# Patient Record
Sex: MALE | Race: WHITE | NOT HISPANIC OR LATINO | Employment: UNEMPLOYED | URBAN - METROPOLITAN AREA
[De-identification: names, ages, dates, MRNs, and addresses within clinical notes are randomized per-mention and may not be internally consistent; named-entity substitution may affect disease eponyms.]

---

## 2018-05-23 ENCOUNTER — HISTORICAL (OUTPATIENT)
Dept: PREADMISSION TESTING | Facility: HOSPITAL | Age: 53
End: 2018-05-23

## 2020-11-11 ENCOUNTER — HISTORICAL (OUTPATIENT)
Dept: ADMINISTRATIVE | Facility: HOSPITAL | Age: 55
End: 2020-11-11

## 2020-11-11 LAB
ANTINUCLEAR ANTIBODY SCREEN (OHS): NEGATIVE
DSDNA ANTIBODY (OHS): NEGATIVE
T PALLIDUM AB SER QL: NONREACTIVE
TSH SERPL-ACNC: 0.61 UIU/ML (ref 0.35–4.94)
VIT B12 SERPL-MCNC: 576 PG/ML (ref 213–816)

## 2022-04-10 ENCOUNTER — HISTORICAL (OUTPATIENT)
Dept: ADMINISTRATIVE | Facility: HOSPITAL | Age: 57
End: 2022-04-10

## 2022-04-25 VITALS
DIASTOLIC BLOOD PRESSURE: 62 MMHG | OXYGEN SATURATION: 98 % | WEIGHT: 186.75 LBS | HEIGHT: 73 IN | BODY MASS INDEX: 24.75 KG/M2 | SYSTOLIC BLOOD PRESSURE: 102 MMHG

## 2022-04-27 ENCOUNTER — HISTORICAL (OUTPATIENT)
Dept: ADMINISTRATIVE | Facility: HOSPITAL | Age: 57
End: 2022-04-27

## 2022-05-06 ENCOUNTER — HOSPITAL ENCOUNTER (EMERGENCY)
Facility: HOSPITAL | Age: 57
Discharge: PSYCHIATRIC OR SUBSTANCE USE FACILITY | End: 2022-05-06
Attending: EMERGENCY MEDICINE

## 2022-05-06 VITALS
HEIGHT: 72 IN | RESPIRATION RATE: 16 BRPM | OXYGEN SATURATION: 99 % | TEMPERATURE: 97 F | SYSTOLIC BLOOD PRESSURE: 128 MMHG | DIASTOLIC BLOOD PRESSURE: 64 MMHG | BODY MASS INDEX: 23.03 KG/M2 | WEIGHT: 170 LBS | HEART RATE: 92 BPM

## 2022-05-06 DIAGNOSIS — R60.0 LEG EDEMA, LEFT: ICD-10-CM

## 2022-05-06 DIAGNOSIS — F02.818 DEMENTIA ASSOCIATED WITH OTHER UNDERLYING DISEASE WITH BEHAVIORAL DISTURBANCE: Primary | ICD-10-CM

## 2022-05-06 LAB
ALBUMIN SERPL-MCNC: 4.4 GM/DL (ref 3.5–5)
ALBUMIN/GLOB SERPL: 1.7 RATIO (ref 1.1–2)
ALP SERPL-CCNC: 78 UNIT/L (ref 40–150)
ALT SERPL-CCNC: 34 UNIT/L (ref 0–55)
AMPHET UR QL SCN: NEGATIVE
APAP SERPL-MCNC: <17.4 UG/ML (ref 17.4–30)
APPEARANCE UR: CLEAR
AST SERPL-CCNC: 33 UNIT/L (ref 5–34)
BACTERIA #/AREA URNS AUTO: NORMAL /HPF
BARBITURATE SCN PRESENT UR: NEGATIVE
BASOPHILS # BLD AUTO: 0.03 X10(3)/MCL (ref 0–0.2)
BASOPHILS NFR BLD AUTO: 0.4 %
BENZODIAZ UR QL SCN: NEGATIVE
BILIRUB UR QL STRIP.AUTO: NEGATIVE MG/DL
BILIRUBIN DIRECT+TOT PNL SERPL-MCNC: 0.3 MG/DL (ref 0–0.5)
BILIRUBIN DIRECT+TOT PNL SERPL-MCNC: 0.6 MG/DL (ref 0–0.8)
BILIRUBIN DIRECT+TOT PNL SERPL-MCNC: 0.9 MG/DL
BUN SERPL-MCNC: 15 MG/DL (ref 8.4–25.7)
CALCIUM SERPL-MCNC: 9.6 MG/DL (ref 8.4–10.2)
CANNABINOIDS UR QL SCN: NEGATIVE
CHLORIDE SERPL-SCNC: 105 MMOL/L (ref 98–107)
CO2 SERPL-SCNC: 28 MMOL/L (ref 22–29)
COCAINE UR QL SCN: NEGATIVE
COLOR UR AUTO: ABNORMAL
CREAT SERPL-MCNC: 0.81 MG/DL (ref 0.73–1.18)
EOSINOPHIL # BLD AUTO: 0.09 X10(3)/MCL (ref 0–0.9)
EOSINOPHIL NFR BLD AUTO: 1.2 %
ERYTHROCYTE [DISTWIDTH] IN BLOOD BY AUTOMATED COUNT: 12.6 % (ref 11.5–17)
ETHANOL SERPL-MCNC: <10 MG/DL
FENTANYL UR QL SCN: NEGATIVE
GLOBULIN SER-MCNC: 2.6 GM/DL (ref 2.4–3.5)
GLUCOSE SERPL-MCNC: 91 MG/DL (ref 74–100)
GLUCOSE UR QL STRIP.AUTO: NEGATIVE MG/DL
HCT VFR BLD AUTO: 43.1 % (ref 42–52)
HGB BLD-MCNC: 13.9 GM/DL (ref 14–18)
IMM GRANULOCYTES # BLD AUTO: 0.01 X10(3)/MCL (ref 0–0.02)
IMM GRANULOCYTES NFR BLD AUTO: 0.1 % (ref 0–0.43)
KETONES UR QL STRIP.AUTO: ABNORMAL MG/DL
LEUKOCYTE ESTERASE UR QL STRIP.AUTO: NEGATIVE UNIT/L
LYMPHOCYTES # BLD AUTO: 1.48 X10(3)/MCL (ref 0.6–4.6)
LYMPHOCYTES NFR BLD AUTO: 20.1 %
MCH RBC QN AUTO: 29.8 PG (ref 27–31)
MCHC RBC AUTO-ENTMCNC: 32.3 MG/DL (ref 33–36)
MCV RBC AUTO: 92.3 FL (ref 80–94)
MDMA UR QL SCN: NEGATIVE
MONOCYTES # BLD AUTO: 0.58 X10(3)/MCL (ref 0.1–1.3)
MONOCYTES NFR BLD AUTO: 7.9 %
NEUTROPHILS # BLD AUTO: 5.2 X10(3)/MCL (ref 2.1–9.2)
NEUTROPHILS NFR BLD AUTO: 70.3 %
NITRITE UR QL STRIP.AUTO: NEGATIVE
NRBC BLD AUTO-RTO: 0 %
OPIATES UR QL SCN: NEGATIVE
PCP UR QL: NEGATIVE
PH UR STRIP.AUTO: 5 [PH]
PH UR: 5 [PH] (ref 3–11)
PLATELET # BLD AUTO: 251 X10(3)/MCL (ref 130–400)
PMV BLD AUTO: 10.5 FL (ref 9.4–12.4)
POTASSIUM SERPL-SCNC: 4.4 MMOL/L (ref 3.5–5.1)
PROT SERPL-MCNC: 7 GM/DL (ref 6.4–8.3)
PROT UR QL STRIP.AUTO: NEGATIVE MG/DL
RBC # BLD AUTO: 4.67 X10(6)/MCL (ref 4.7–6.1)
RBC #/AREA URNS AUTO: NORMAL /HPF
RBC UR QL AUTO: NEGATIVE UNIT/L
SARS-COV-2 RDRP RESP QL NAA+PROBE: NEGATIVE
SODIUM SERPL-SCNC: 141 MMOL/L (ref 136–145)
SP GR UR STRIP.AUTO: 1.03 (ref 1–1.03)
SPECIFIC GRAVITY, URINE AUTO (.000) (OHS): 1.03 (ref 1–1.03)
SQUAMOUS #/AREA URNS AUTO: NORMAL /LPF
TSH SERPL-ACNC: 0.91 UIU/ML (ref 0.35–4.94)
UROBILINOGEN UR STRIP-ACNC: 1 MG/DL
WBC # SPEC AUTO: 7.4 X10(3)/MCL (ref 4.5–11.5)
WBC #/AREA URNS AUTO: NORMAL /HPF

## 2022-05-06 PROCEDURE — 82077 ASSAY SPEC XCP UR&BREATH IA: CPT | Performed by: PHYSICIAN ASSISTANT

## 2022-05-06 PROCEDURE — 36415 COLL VENOUS BLD VENIPUNCTURE: CPT | Performed by: PHYSICIAN ASSISTANT

## 2022-05-06 PROCEDURE — 81003 URINALYSIS AUTO W/O SCOPE: CPT | Mod: 59 | Performed by: PHYSICIAN ASSISTANT

## 2022-05-06 PROCEDURE — 85025 COMPLETE CBC W/AUTO DIFF WBC: CPT | Performed by: PHYSICIAN ASSISTANT

## 2022-05-06 PROCEDURE — 80143 DRUG ASSAY ACETAMINOPHEN: CPT | Performed by: PHYSICIAN ASSISTANT

## 2022-05-06 PROCEDURE — 81015 MICROSCOPIC EXAM OF URINE: CPT | Performed by: PHYSICIAN ASSISTANT

## 2022-05-06 PROCEDURE — 80307 DRUG TEST PRSMV CHEM ANLYZR: CPT | Performed by: PHYSICIAN ASSISTANT

## 2022-05-06 PROCEDURE — 80053 COMPREHEN METABOLIC PANEL: CPT | Performed by: PHYSICIAN ASSISTANT

## 2022-05-06 PROCEDURE — 87635 SARS-COV-2 COVID-19 AMP PRB: CPT | Performed by: EMERGENCY MEDICINE

## 2022-05-06 PROCEDURE — 99285 EMERGENCY DEPT VISIT HI MDM: CPT | Mod: 25

## 2022-05-06 PROCEDURE — 84443 ASSAY THYROID STIM HORMONE: CPT | Performed by: PHYSICIAN ASSISTANT

## 2022-05-06 NOTE — ED PROVIDER NOTES
Encounter Date: 5/6/2022       History     Chief Complaint   Patient presents with    Psychiatric Evaluation     Sister reports abnormal behavior at home (placing dirty diapers in the dryer,etc.). pmhx of Alzheimers. [facility].      57-year-old male with a history of early-onset Alzheimer's presents to the emergency department for psychiatric evaluation per family report, worsening behavioral disturbances at home.  They report that he has had some increasing aggressive episodes including last night.  He is calm and cooperative here.  They report that he has had behaviors that they can no longer care for him at home.  They did try to get him placed in inpatient residential facility; however, the patient eloped from the building.  They contacted [facility] today and were told to come here for medical clearance evaluation and placement under pec.  Patient unable to contribute at all to history due to dementia.    They did report that they noted some left leg swelling and redness, no known trauma.  He does spend most of his day sitting with his legs dependent.  No noted shortness of breath or fever.    The history is provided by a relative. The history is limited by the condition of the patient.   Illness   The problem occurs continuously. The problem has been gradually worsening. Nothing relieves the symptoms. Nothing aggravates the symptoms. Pertinent negatives include no fever, no diarrhea and no vomiting.     Review of patient's allergies indicates:   Allergen Reactions    Penicillins Swelling     Past Medical History:   Diagnosis Date    Unspecified dementia without behavioral disturbance      No past surgical history on file.  No family history on file.   Multiple family members w/ hx dementia    Review of Systems   Unable to perform ROS: Dementia   Constitutional: Negative for fever.   Gastrointestinal: Negative for diarrhea and vomiting.       Physical Exam     Initial Vitals  [05/06/22 1242]   BP Pulse Resp Temp SpO2   116/74 75 20 97.7 °F (36.5 °C) 100 %      MAP       --         Physical Exam    Nursing note and vitals reviewed.  Constitutional: He appears well-developed and well-nourished. No distress.   HENT:   Head: Normocephalic and atraumatic.   Eyes: EOM are normal. Pupils are equal, round, and reactive to light.   Neck: Neck supple.   Cardiovascular: Normal rate and regular rhythm.   LLE mild edema, nonpitting   Pulmonary/Chest: Breath sounds normal. He is in respiratory distress.   Abdominal: Abdomen is soft.   Musculoskeletal:      Cervical back: Neck supple.      Comments: Mild tenderness w/ palpation of calf, no deformity, no bony tenderness     Neurological: He is alert.   Psychiatric: He is slowed. Cognition and memory are impaired.         ED Course   Procedures  Labs Reviewed   URINALYSIS, REFLEX TO URINE CULTURE - Abnormal; Notable for the following components:       Result Value    Color, UA Dark Yellow (*)     Ketones, UA Trace (*)     All other components within normal limits   ACETAMINOPHEN LEVEL - Abnormal; Notable for the following components:    Acetaminophen Level <17.4 (*)     All other components within normal limits   CBC WITH DIFFERENTIAL - Abnormal; Notable for the following components:    RBC 4.67 (*)     Hgb 13.9 (*)     MCHC 32.3 (*)     All other components within normal limits   TSH - Normal   DRUG SCREEN, URINE (BEAKER) - Normal   ALCOHOL,MEDICAL (ETHANOL) - Normal   URINALYSIS, MICROSCOPIC - Normal   CBC W/ AUTO DIFFERENTIAL    Narrative:     The following orders were created for panel order CBC auto differential.  Procedure                               Abnormality         Status                     ---------                               -----------         ------                     CBC with Differential[[id number]]        Abnormal            Final result                 Please view results for these tests on the individual orders.   COMPREHENSIVE  METABOLIC PANEL          Imaging Results    None       X-Rays:   Independently Interpreted Readings:   Other Readings:  Vascular ultrasound: The left lower extremity demonstrated no evidence of superficial or deep vein thrombosis.    Medications - No data to display  Medical Decision Making:   Initial Assessment:   Mr. iVllalta carries a diagnosis of early-onset dementia, now with increasing behavioral disturbances, brought in by family for behavioral psych admission.  Calm, cooperative here.  Also noted some left leg swelling with no reported injury.  Patient able to ambulate without difficulty.  Differential Diagnosis:   Dementia with superimposed delirium, gradual progressive dementia.  DVT, dependent edema, calf strain, Baker cyst  Clinical Tests:   Lab Tests: Ordered and Reviewed  The following lab test(s) were unremarkable: CBC, CMP and Urinalysis  Radiological Study: Ordered and Reviewed  ED Management:  Physician's Emergency certificate file for grave disability given family's report of this progressively worsening cognitive decline likely related to progressive dementia changes.  They report some aggressive behaviors at home however not demonstrated here.  He has been calm and cooperative and has not required p.r.n. medications.  Vascular ultrasound undertaken demonstrates no DVT or other acute findings.  The leg does have some edema but grossly does not appear infected at this time.  Sister reports that he essentially sits with his legs dependent at all times, suspect that he has some venous stasis changes to that side.  No leukocytosis, no fever, no skin breakdown.  No indication for antibiotics at this time could continue to monitor and if symptoms progress re-evaluate.  Remainder of labs and physical examination without acute abnormality that would preclude transfer for inpatient psychiatric treatment.                      Clinical Impression:   Final diagnoses:  [R60.0] Leg edema, left                  Anita Ramirez MD  05/06/22 1951

## 2022-05-06 NOTE — ED TRIAGE NOTES
Pt with hx of early onset Dementia/Alzheimer's presents to the ED with sister reporting patient has been acting more and more unlike himself, has been putting dirty diapers in the dryer for example. Spoke with [facility] per family and were told he could go to [facility] in [place] and then transition to a nursing home. Pt able to say his name, but not oriented otherwise which is baseline.

## 2022-05-06 NOTE — PROGRESS NOTES
"Received a call this morning from Brenda Alexander stating that patient has dementia and was on the way to the hospital to be evaluated for a PEC/ medical clearance. She stated that the family was concerned "if it wasn't handled right he would elope." Updated Dr. Ramirez.  "

## 2022-05-06 NOTE — FIRST PROVIDER EVALUATION
Medical screening exam completed.  I have conducted a focused provider triage encounter, findings are as follows:    Brief history of present illness:   Patient was diagnosed with early dementia. As per sister he is not acting well. He is not himself. And he is very anxious       There were no vitals filed for this visit.    Pertinent physical exam:  He is not alert to place not time. He is currently not anxious.     Brief workup plan:  Will be getting medical clearance for mental treatment protocol.     Preliminary workup initiated; this workup will be continued and followed by the physician or advanced practice provider that is assigned to the patient when roomed.

## 2022-05-09 ENCOUNTER — TELEPHONE (OUTPATIENT)
Dept: INTERNAL MEDICINE | Facility: CLINIC | Age: 57
End: 2022-05-09

## 2022-06-20 ENCOUNTER — TELEPHONE (OUTPATIENT)
Dept: INTERNAL MEDICINE | Facility: CLINIC | Age: 57
End: 2022-06-20

## 2022-09-26 ENCOUNTER — TELEPHONE (OUTPATIENT)
Dept: NEUROLOGY | Facility: CLINIC | Age: 57
End: 2022-09-26

## 2022-09-26 DIAGNOSIS — G30.9 ALZHEIMER'S DISEASE: Primary | ICD-10-CM

## 2022-09-26 DIAGNOSIS — F02.80 ALZHEIMER'S DISEASE: Primary | ICD-10-CM

## 2022-09-26 NOTE — TELEPHONE ENCOUNTER
States pt will be changing to their pharmacy after moving into a nursing home. Is asking if new prescription can be sent in for these medication.     Medication: Donepezil 10 mg, Memantine 10 mg    [facility] [place]    Last Appointment: 11/24/21    Next Appointment: 12/05/22

## 2022-09-27 RX ORDER — DONEPEZIL HYDROCHLORIDE 10 MG/1
10 TABLET, FILM COATED ORAL DAILY
Qty: 30 TABLET | Refills: 12 | Status: SHIPPED | OUTPATIENT
Start: 2022-09-27

## 2022-09-27 RX ORDER — MEMANTINE HYDROCHLORIDE 10 MG/1
10 TABLET ORAL 2 TIMES DAILY
Qty: 30 TABLET | Refills: 12 | Status: SHIPPED | OUTPATIENT
Start: 2022-09-27